# Patient Record
Sex: MALE | Race: BLACK OR AFRICAN AMERICAN | NOT HISPANIC OR LATINO | Employment: UNEMPLOYED | URBAN - METROPOLITAN AREA
[De-identification: names, ages, dates, MRNs, and addresses within clinical notes are randomized per-mention and may not be internally consistent; named-entity substitution may affect disease eponyms.]

---

## 2024-10-20 PROBLEM — I10 HTN (HYPERTENSION): Status: ACTIVE | Noted: 2024-10-20

## 2024-10-20 PROBLEM — I50.9 ACUTE ON CHRONIC HEART FAILURE: Status: ACTIVE | Noted: 2024-10-20

## 2024-10-20 PROBLEM — I80.8 THROMBOPHLEBITIS OF CEPHALIC VEIN: Status: ACTIVE | Noted: 2024-10-20

## 2024-10-31 ENCOUNTER — NURSE TRIAGE (OUTPATIENT)
Dept: ADMINISTRATIVE | Facility: CLINIC | Age: 59
End: 2024-10-31

## 2025-01-25 DIAGNOSIS — U07.1 COVID-19 VIRUS DETECTED: ICD-10-CM

## 2025-01-29 PROBLEM — K85.90 ACUTE PANCREATITIS WITHOUT INFECTION OR NECROSIS: Status: ACTIVE | Noted: 2025-01-29

## 2025-01-29 PROBLEM — F17.200 TOBACCO DEPENDENCY: Status: ACTIVE | Noted: 2025-01-29

## 2025-01-29 PROBLEM — F10.939 ALCOHOL WITHDRAWAL: Status: ACTIVE | Noted: 2025-01-29

## 2025-01-30 PROBLEM — E83.42 HYPOMAGNESEMIA: Status: ACTIVE | Noted: 2025-01-30

## 2025-01-30 PROBLEM — F10.29 ALCOHOL DEPENDENCE WITH UNSPECIFIED ALCOHOL-INDUCED DISORDER: Status: ACTIVE | Noted: 2025-01-30

## 2025-01-30 PROBLEM — E87.6 HYPOKALEMIA: Status: ACTIVE | Noted: 2025-01-30

## 2025-01-30 PROBLEM — R79.89 ELEVATED TROPONIN: Status: ACTIVE | Noted: 2025-01-30

## 2025-01-30 PROBLEM — U07.1 COVID-19: Status: ACTIVE | Noted: 2025-01-30

## 2025-05-20 ENCOUNTER — NURSE TRIAGE (OUTPATIENT)
Dept: ADMINISTRATIVE | Facility: CLINIC | Age: 60
End: 2025-05-20
Payer: MEDICARE

## 2025-05-20 NOTE — TELEPHONE ENCOUNTER
Kirangregory (ex-wife) stated patient is located at the [place] [facility].  Patient is not currently in a bed at [facility] ().  Shanna was advised to call patient directly.  No further action needed.  Call was cancelled.   Reason for Disposition   Caller has cancelled the call before the first contact    Protocols used: No Contact or Duplicate Contact Call-A-